# Patient Record
Sex: FEMALE | URBAN - METROPOLITAN AREA
[De-identification: names, ages, dates, MRNs, and addresses within clinical notes are randomized per-mention and may not be internally consistent; named-entity substitution may affect disease eponyms.]

---

## 2019-07-22 ENCOUNTER — IMPORTED ENCOUNTER (OUTPATIENT)
Dept: URBAN - METROPOLITAN AREA CLINIC 9 | Facility: CLINIC | Age: 74
End: 2019-07-22

## 2019-09-18 ENCOUNTER — IMPORTED ENCOUNTER (OUTPATIENT)
Dept: URBAN - METROPOLITAN AREA CLINIC 9 | Facility: CLINIC | Age: 74
End: 2019-09-18

## 2021-10-16 ASSESSMENT — VISUAL ACUITY
OS_CC: 20/80 SN
OD_CC: 20/40 - SN
OD_CC: 20/40 - SN
OS_SC: 20/80 SN
OS_CC: 20/80 SN
OD_SC: 20/40 -2 SN
OS_SC: 20/70 -2 SN
OD_SC: 20/40 - SN

## 2021-10-16 ASSESSMENT — TONOMETRY
OS_IOP_MMHG: 24
OD_IOP_MMHG: 18
OD_IOP_MMHG: 16
OS_IOP_MMHG: 18

## 2021-12-09 NOTE — PATIENT DISCUSSION
Pt interested in having extra skin/fat removed from BOTH upper lids. Pricing given ($3900). Confirmed okay with [facility].

## 2021-12-09 NOTE — PATIENT DISCUSSION
Recommend RIGHT upper lid ptosis repair. Predeterm. Skin/fat to be determined. Skin does not affect MRDs. This will not meet insurance criteria. Skin/fat removal will be cosmetic. (discussed risks and benefits of sx. .. ).

## 2022-01-20 NOTE — PATIENT DISCUSSION
This visual field clearly demonstrated a minimum of 49% loss of upper field of vision OD, with upper lid skin in repose and elevated by taping of the lid to demonstrate potential correction. This field shows that taping the lids significantly improved this patient's superior field of vision by approximately 49%, OD.

## 2022-08-01 NOTE — PROCEDURE NOTE: SURGICAL
"<div><p><strong>PREOPERATIVE DIAGNOSIS:</strong> <span>&nbsp;&nbsp;&nbsp;&nbsp;&nbsp;&nbsp;&nbsp;&nbsp;&nbsp;&nbsp;&nbsp;&nbsp;&nbsp; </span>Right upper lid ptosis</p><p>&nbsp;</p><p><strong>POSTOPERATIVE DIAGNOSIS:</strong> <span>&nbsp;&nbsp;&nbsp;&nbsp;&nbsp;&nbsp;&nbsp;&nbsp;&nbsp;&nbsp;&nbsp; </span>Same </p><p>&nbsp;</p><p><strong>PROCEDURE:</strong> <span>&nbsp; &nbsp; &nbsp; &nbsp; &nbsp; &nbsp; &nbsp; &nbsp; &nbsp;</span>Right upper lid ptosis repair. </p><p>&nbsp;</p><p><strong>SURGEON:</strong> <span>&nbsp; &nbsp; &nbsp; &nbsp; &nbsp; &nbsp; &nbsp; &nbsp; &nbsp; &nbsp; </span>Wilmar Mullen MD </p><p>&nbsp;</p><p><strong>ANESTHESIA:</strong> <span>&nbsp; &nbsp; &nbsp; &nbsp; &nbsp; &nbsp; &nbsp; &nbsp; &nbsp; &nbsp;</span><span>Local MAC.</span></p><p>&nbsp;</p><p><strong>ESTIMATED BLOOD LOSS:</strong> <span>&nbsp;&nbsp;&nbsp;&nbsp;&nbsp;&nbsp;&nbsp;&nbsp;&nbsp;&nbsp;&nbsp;&nbsp;&nbsp;&nbsp;&nbsp;&nbsp;&nbsp;&nbsp; </span>Minimal &lt;10 cc. </p><p>&nbsp;</p><p><strong>COMPLICATIONS:</strong> <span>&nbsp;&nbsp;&nbsp;&nbsp;&nbsp;&nbsp;&nbsp;&nbsp;&nbsp;&nbsp;&nbsp;&nbsp;&nbsp;&nbsp;&nbsp;&nbsp;&nbsp;&nbsp;&nbsp;&nbsp;&nbsp;&nbsp;&nbsp;&nbsp;&nbsp;&nbsp;&nbsp;&nbsp;&nbsp;&nbsp;&nbsp;&nbsp;&nbsp;&nbsp; </span>None. 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 This ptotic lid had decreased the superior and lateral visual field causing the patient decrease visual function. We have discussed the benefits of raising the eyelid to improve visual function in this patient.  We have
